# Patient Record
Sex: FEMALE | Race: WHITE | Employment: UNEMPLOYED | URBAN - METROPOLITAN AREA
[De-identification: names, ages, dates, MRNs, and addresses within clinical notes are randomized per-mention and may not be internally consistent; named-entity substitution may affect disease eponyms.]

---

## 2019-05-05 ENCOUNTER — APPOINTMENT (OUTPATIENT)
Dept: GENERAL RADIOLOGY | Age: 79
DRG: 175 | End: 2019-05-05
Payer: MEDICARE

## 2019-05-05 ENCOUNTER — HOSPITAL ENCOUNTER (INPATIENT)
Age: 79
LOS: 1 days | Discharge: HOSPICE | DRG: 175 | End: 2019-05-07
Attending: EMERGENCY MEDICINE | Admitting: HOSPITALIST
Payer: MEDICARE

## 2019-05-05 DIAGNOSIS — I50.9 ACUTE ON CHRONIC CONGESTIVE HEART FAILURE, UNSPECIFIED HEART FAILURE TYPE (HCC): Primary | ICD-10-CM

## 2019-05-05 DIAGNOSIS — R09.02 HYPOXIA: ICD-10-CM

## 2019-05-05 LAB
ALBUMIN SERPL-MCNC: 3.5 GM/DL (ref 3.4–5)
ALP BLD-CCNC: 105 IU/L (ref 40–128)
ALT SERPL-CCNC: 24 U/L (ref 10–40)
ANION GAP SERPL CALCULATED.3IONS-SCNC: 8 MMOL/L (ref 4–16)
AST SERPL-CCNC: 13 IU/L (ref 15–37)
BASE EXCESS MIXED: ABNORMAL (ref 0–2.3)
BASOPHILS ABSOLUTE: 0.1 K/CU MM
BASOPHILS RELATIVE PERCENT: 0.6 % (ref 0–1)
BILIRUB SERPL-MCNC: 0.7 MG/DL (ref 0–1)
BUN BLDV-MCNC: 22 MG/DL (ref 6–23)
CALCIUM SERPL-MCNC: 9 MG/DL (ref 8.3–10.6)
CHLORIDE BLD-SCNC: 96 MMOL/L (ref 99–110)
CO2: 39 MMOL/L (ref 21–32)
COMMENT: ABNORMAL
CREAT SERPL-MCNC: 1.2 MG/DL (ref 0.6–1.1)
DIFFERENTIAL TYPE: ABNORMAL
EOSINOPHILS ABSOLUTE: 0.5 K/CU MM
EOSINOPHILS RELATIVE PERCENT: 6.3 % (ref 0–3)
GFR AFRICAN AMERICAN: 52 ML/MIN/1.73M2
GFR NON-AFRICAN AMERICAN: 43 ML/MIN/1.73M2
GLUCOSE BLD-MCNC: 123 MG/DL (ref 70–99)
HCO3 VENOUS: 45.6 MMOL/L (ref 19–25)
HCT VFR BLD CALC: 41.1 % (ref 37–47)
HEMOGLOBIN: 12.2 GM/DL (ref 12.5–16)
IMMATURE NEUTROPHIL %: 0.5 % (ref 0–0.43)
LYMPHOCYTES ABSOLUTE: 1.6 K/CU MM
LYMPHOCYTES RELATIVE PERCENT: 19.8 % (ref 24–44)
MCH RBC QN AUTO: 27.9 PG (ref 27–31)
MCHC RBC AUTO-ENTMCNC: 29.7 % (ref 32–36)
MCV RBC AUTO: 94.1 FL (ref 78–100)
MONOCYTES ABSOLUTE: 0.5 K/CU MM
MONOCYTES RELATIVE PERCENT: 6.6 % (ref 0–4)
NUCLEATED RBC %: 0 %
O2 SAT, VEN: 77.4 % (ref 50–70)
PCO2, VEN: 77 MMHG (ref 38–52)
PDW BLD-RTO: 16.4 % (ref 11.7–14.9)
PH VENOUS: 7.38 (ref 7.32–7.42)
PLATELET # BLD: 182 K/CU MM (ref 140–440)
PMV BLD AUTO: 10.9 FL (ref 7.5–11.1)
PO2, VEN: 45 MMHG (ref 28–48)
POTASSIUM SERPL-SCNC: 4 MMOL/L (ref 3.5–5.1)
PRO-BNP: 4415 PG/ML
RBC # BLD: 4.37 M/CU MM (ref 4.2–5.4)
SEGMENTED NEUTROPHILS ABSOLUTE COUNT: 5.3 K/CU MM
SEGMENTED NEUTROPHILS RELATIVE PERCENT: 66.2 % (ref 36–66)
SODIUM BLD-SCNC: 143 MMOL/L (ref 135–145)
TOTAL IMMATURE NEUTOROPHIL: 0.04 K/CU MM
TOTAL NUCLEATED RBC: 0 K/CU MM
TOTAL PROTEIN: 5.7 GM/DL (ref 6.4–8.2)
TROPONIN T: <0.01 NG/ML
WBC # BLD: 8 K/CU MM (ref 4–10.5)

## 2019-05-05 PROCEDURE — 99285 EMERGENCY DEPT VISIT HI MDM: CPT

## 2019-05-05 PROCEDURE — 93005 ELECTROCARDIOGRAM TRACING: CPT | Performed by: EMERGENCY MEDICINE

## 2019-05-05 PROCEDURE — 85025 COMPLETE CBC W/AUTO DIFF WBC: CPT

## 2019-05-05 PROCEDURE — 80053 COMPREHEN METABOLIC PANEL: CPT

## 2019-05-05 PROCEDURE — 82805 BLOOD GASES W/O2 SATURATION: CPT

## 2019-05-05 PROCEDURE — 71045 X-RAY EXAM CHEST 1 VIEW: CPT

## 2019-05-05 PROCEDURE — 83880 ASSAY OF NATRIURETIC PEPTIDE: CPT

## 2019-05-05 PROCEDURE — 84484 ASSAY OF TROPONIN QUANT: CPT

## 2019-05-05 RX ORDER — NYSTATIN 100000 [USP'U]/G
POWDER TOPICAL 2 TIMES DAILY
COMMUNITY

## 2019-05-05 RX ORDER — ACETAMINOPHEN 500 MG
1000 TABLET ORAL EVERY 4 HOURS PRN
COMMUNITY

## 2019-05-05 RX ORDER — LIDOCAINE 4 G/G
1 PATCH TOPICAL DAILY
COMMUNITY

## 2019-05-05 RX ORDER — ATENOLOL 50 MG/1
50 TABLET ORAL NIGHTLY
COMMUNITY

## 2019-05-05 RX ORDER — ATORVASTATIN CALCIUM 10 MG/1
10 TABLET, FILM COATED ORAL DAILY
Status: ON HOLD | COMMUNITY
End: 2019-05-07 | Stop reason: HOSPADM

## 2019-05-05 RX ORDER — IPRATROPIUM BROMIDE AND ALBUTEROL SULFATE 2.5; .5 MG/3ML; MG/3ML
1 SOLUTION RESPIRATORY (INHALATION) EVERY 6 HOURS
COMMUNITY

## 2019-05-05 RX ORDER — FUROSEMIDE 40 MG/1
40 TABLET ORAL DAILY
COMMUNITY

## 2019-05-05 RX ORDER — FLUTICASONE FUROATE AND VILANTEROL 100; 25 UG/1; UG/1
1 POWDER RESPIRATORY (INHALATION) DAILY
COMMUNITY

## 2019-05-05 RX ORDER — GABAPENTIN 100 MG/1
200 CAPSULE ORAL 3 TIMES DAILY
COMMUNITY

## 2019-05-05 RX ORDER — CETIRIZINE HYDROCHLORIDE 10 MG/1
10 TABLET ORAL NIGHTLY
Status: ON HOLD | COMMUNITY
End: 2019-05-07 | Stop reason: HOSPADM

## 2019-05-05 SDOH — HEALTH STABILITY: MENTAL HEALTH: HOW OFTEN DO YOU HAVE A DRINK CONTAINING ALCOHOL?: NEVER

## 2019-05-05 ASSESSMENT — ENCOUNTER SYMPTOMS
ABDOMINAL PAIN: 0
SHORTNESS OF BREATH: 1
BACK PAIN: 0
WHEEZING: 0
VOMITING: 0
SORE THROAT: 0
RHINORRHEA: 0
NAUSEA: 0
EYE REDNESS: 0
COUGH: 0

## 2019-05-06 ENCOUNTER — APPOINTMENT (OUTPATIENT)
Dept: ULTRASOUND IMAGING | Age: 79
DRG: 175 | End: 2019-05-06
Payer: MEDICARE

## 2019-05-06 ENCOUNTER — APPOINTMENT (OUTPATIENT)
Dept: CT IMAGING | Age: 79
DRG: 175 | End: 2019-05-06
Payer: MEDICARE

## 2019-05-06 PROBLEM — I50.43 CHF (CONGESTIVE HEART FAILURE), NYHA CLASS I, ACUTE ON CHRONIC, COMBINED (HCC): Status: ACTIVE | Noted: 2019-05-06

## 2019-05-06 LAB
ANION GAP SERPL CALCULATED.3IONS-SCNC: 8 MMOL/L (ref 4–16)
BUN BLDV-MCNC: 21 MG/DL (ref 6–23)
CALCIUM SERPL-MCNC: 8.8 MG/DL (ref 8.3–10.6)
CHLORIDE BLD-SCNC: 95 MMOL/L (ref 99–110)
CO2: 38 MMOL/L (ref 21–32)
CREAT SERPL-MCNC: 1.1 MG/DL (ref 0.6–1.1)
GFR AFRICAN AMERICAN: 58 ML/MIN/1.73M2
GFR NON-AFRICAN AMERICAN: 48 ML/MIN/1.73M2
GLUCOSE BLD-MCNC: 135 MG/DL (ref 70–99)
LV EF: 50 %
LVEF MODALITY: NORMAL
MAGNESIUM: 2.4 MG/DL (ref 1.8–2.4)
POTASSIUM SERPL-SCNC: 3.7 MMOL/L (ref 3.5–5.1)
SODIUM BLD-SCNC: 141 MMOL/L (ref 135–145)
TROPONIN T: <0.01 NG/ML
TROPONIN T: <0.01 NG/ML

## 2019-05-06 PROCEDURE — 84484 ASSAY OF TROPONIN QUANT: CPT

## 2019-05-06 PROCEDURE — 2700000000 HC OXYGEN THERAPY PER DAY

## 2019-05-06 PROCEDURE — 6360000004 HC RX CONTRAST MEDICATION: Performed by: HOSPITALIST

## 2019-05-06 PROCEDURE — 83735 ASSAY OF MAGNESIUM: CPT

## 2019-05-06 PROCEDURE — 2140000000 HC CCU INTERMEDIATE R&B

## 2019-05-06 PROCEDURE — 2580000003 HC RX 258: Performed by: HOSPITALIST

## 2019-05-06 PROCEDURE — 80048 BASIC METABOLIC PNL TOTAL CA: CPT

## 2019-05-06 PROCEDURE — 71275 CT ANGIOGRAPHY CHEST: CPT

## 2019-05-06 PROCEDURE — 93306 TTE W/DOPPLER COMPLETE: CPT

## 2019-05-06 PROCEDURE — 6370000000 HC RX 637 (ALT 250 FOR IP): Performed by: HOSPITALIST

## 2019-05-06 PROCEDURE — 99221 1ST HOSP IP/OBS SF/LOW 40: CPT | Performed by: INTERNAL MEDICINE

## 2019-05-06 PROCEDURE — 6360000002 HC RX W HCPCS: Performed by: HOSPITALIST

## 2019-05-06 PROCEDURE — 6360000002 HC RX W HCPCS: Performed by: INTERNAL MEDICINE

## 2019-05-06 PROCEDURE — 6370000000 HC RX 637 (ALT 250 FOR IP): Performed by: NURSE PRACTITIONER

## 2019-05-06 PROCEDURE — 93970 EXTREMITY STUDY: CPT

## 2019-05-06 PROCEDURE — 94640 AIRWAY INHALATION TREATMENT: CPT

## 2019-05-06 PROCEDURE — 36415 COLL VENOUS BLD VENIPUNCTURE: CPT

## 2019-05-06 RX ORDER — FUROSEMIDE 10 MG/ML
20 INJECTION INTRAMUSCULAR; INTRAVENOUS 2 TIMES DAILY
Status: DISCONTINUED | OUTPATIENT
Start: 2019-05-06 | End: 2019-05-07 | Stop reason: HOSPADM

## 2019-05-06 RX ORDER — SODIUM CHLORIDE 0.9 % (FLUSH) 0.9 %
10 SYRINGE (ML) INJECTION PRN
Status: DISCONTINUED | OUTPATIENT
Start: 2019-05-06 | End: 2019-05-07 | Stop reason: HOSPADM

## 2019-05-06 RX ORDER — ATORVASTATIN CALCIUM 10 MG/1
10 TABLET, FILM COATED ORAL DAILY
Status: DISCONTINUED | OUTPATIENT
Start: 2019-05-06 | End: 2019-05-07 | Stop reason: HOSPADM

## 2019-05-06 RX ORDER — ATENOLOL 25 MG/1
12.5 TABLET ORAL NIGHTLY
Status: DISCONTINUED | OUTPATIENT
Start: 2019-05-07 | End: 2019-05-07 | Stop reason: HOSPADM

## 2019-05-06 RX ORDER — SODIUM CHLORIDE 0.9 % (FLUSH) 0.9 %
10 SYRINGE (ML) INJECTION EVERY 12 HOURS SCHEDULED
Status: DISCONTINUED | OUTPATIENT
Start: 2019-05-06 | End: 2019-05-07 | Stop reason: HOSPADM

## 2019-05-06 RX ORDER — GABAPENTIN 100 MG/1
200 CAPSULE ORAL 3 TIMES DAILY
Status: DISCONTINUED | OUTPATIENT
Start: 2019-05-06 | End: 2019-05-07 | Stop reason: HOSPADM

## 2019-05-06 RX ORDER — ASPIRIN 81 MG/1
81 TABLET, CHEWABLE ORAL DAILY
Status: DISCONTINUED | OUTPATIENT
Start: 2019-05-06 | End: 2019-05-07 | Stop reason: HOSPADM

## 2019-05-06 RX ORDER — MULTIVITAMIN WITH FOLIC ACID 400 MCG
1 TABLET ORAL DAILY
Status: DISCONTINUED | OUTPATIENT
Start: 2019-05-06 | End: 2019-05-07 | Stop reason: HOSPADM

## 2019-05-06 RX ORDER — IPRATROPIUM BROMIDE AND ALBUTEROL SULFATE 2.5; .5 MG/3ML; MG/3ML
1 SOLUTION RESPIRATORY (INHALATION)
Status: DISCONTINUED | OUTPATIENT
Start: 2019-05-06 | End: 2019-05-07 | Stop reason: HOSPADM

## 2019-05-06 RX ORDER — ACETAMINOPHEN 325 MG/1
650 TABLET ORAL EVERY 4 HOURS PRN
Status: DISCONTINUED | OUTPATIENT
Start: 2019-05-06 | End: 2019-05-07 | Stop reason: HOSPADM

## 2019-05-06 RX ORDER — ONDANSETRON 2 MG/ML
4 INJECTION INTRAMUSCULAR; INTRAVENOUS EVERY 6 HOURS PRN
Status: DISCONTINUED | OUTPATIENT
Start: 2019-05-06 | End: 2019-05-07 | Stop reason: HOSPADM

## 2019-05-06 RX ADMIN — ACETAMINOPHEN 650 MG: 325 TABLET ORAL at 20:23

## 2019-05-06 RX ADMIN — IPRATROPIUM BROMIDE AND ALBUTEROL SULFATE 1 AMPULE: .5; 3 SOLUTION RESPIRATORY (INHALATION) at 19:09

## 2019-05-06 RX ADMIN — IPRATROPIUM BROMIDE AND ALBUTEROL SULFATE 1 AMPULE: .5; 3 SOLUTION RESPIRATORY (INHALATION) at 12:24

## 2019-05-06 RX ADMIN — CEFTRIAXONE 1 G: 1 INJECTION, POWDER, FOR SOLUTION INTRAMUSCULAR; INTRAVENOUS at 07:03

## 2019-05-06 RX ADMIN — IPRATROPIUM BROMIDE AND ALBUTEROL SULFATE 1 AMPULE: .5; 3 SOLUTION RESPIRATORY (INHALATION) at 08:33

## 2019-05-06 RX ADMIN — FUROSEMIDE 20 MG: 10 INJECTION, SOLUTION INTRAVENOUS at 09:03

## 2019-05-06 RX ADMIN — SODIUM CHLORIDE, PRESERVATIVE FREE 10 ML: 5 INJECTION INTRAVENOUS at 20:27

## 2019-05-06 RX ADMIN — ASPIRIN 81 MG 81 MG: 81 TABLET ORAL at 09:03

## 2019-05-06 RX ADMIN — GABAPENTIN 200 MG: 100 CAPSULE ORAL at 09:03

## 2019-05-06 RX ADMIN — ATORVASTATIN CALCIUM 10 MG: 10 TABLET, FILM COATED ORAL at 09:03

## 2019-05-06 RX ADMIN — FUROSEMIDE 20 MG: 10 INJECTION, SOLUTION INTRAVENOUS at 02:06

## 2019-05-06 RX ADMIN — IPRATROPIUM BROMIDE AND ALBUTEROL SULFATE 1 AMPULE: .5; 3 SOLUTION RESPIRATORY (INHALATION) at 15:19

## 2019-05-06 RX ADMIN — THERA TABS 1 TABLET: TAB at 09:03

## 2019-05-06 RX ADMIN — SODIUM CHLORIDE, PRESERVATIVE FREE 10 ML: 5 INJECTION INTRAVENOUS at 09:03

## 2019-05-06 RX ADMIN — GABAPENTIN 200 MG: 100 CAPSULE ORAL at 20:23

## 2019-05-06 RX ADMIN — GABAPENTIN 200 MG: 100 CAPSULE ORAL at 14:46

## 2019-05-06 RX ADMIN — IOPAMIDOL 80 ML: 755 INJECTION, SOLUTION INTRAVENOUS at 03:01

## 2019-05-06 RX ADMIN — FUROSEMIDE 20 MG: 10 INJECTION, SOLUTION INTRAVENOUS at 20:24

## 2019-05-06 RX ADMIN — ENOXAPARIN SODIUM 135 MG: 150 INJECTION SUBCUTANEOUS at 04:01

## 2019-05-06 RX ADMIN — AZITHROMYCIN MONOHYDRATE 500 MG: 500 INJECTION, POWDER, LYOPHILIZED, FOR SOLUTION INTRAVENOUS at 07:33

## 2019-05-06 ASSESSMENT — PAIN SCALES - GENERAL
PAINLEVEL_OUTOF10: 2
PAINLEVEL_OUTOF10: 2
PAINLEVEL_OUTOF10: 0
PAINLEVEL_OUTOF10: 0

## 2019-05-06 ASSESSMENT — PAIN DESCRIPTION - LOCATION: LOCATION: GENERALIZED

## 2019-05-06 ASSESSMENT — PAIN DESCRIPTION - PAIN TYPE: TYPE: ACUTE PAIN

## 2019-05-06 ASSESSMENT — PAIN DESCRIPTION - ONSET: ONSET: GRADUAL

## 2019-05-06 ASSESSMENT — PAIN DESCRIPTION - FREQUENCY: FREQUENCY: INTERMITTENT

## 2019-05-06 ASSESSMENT — PAIN DESCRIPTION - DESCRIPTORS: DESCRIPTORS: ACHING;DISCOMFORT

## 2019-05-06 NOTE — CONSULTS
Nutrition Education    Type and Reason for Visit: Consult, Patient Education    · Verbally reviewed following information with Patient and Family: Heart Healthy Eating Nutrition Therapy (Nutrition Care Manual). · Written educational materials provided. · Contact name and number provided. · Refer to Patient Education activity for more details.     Electronically signed by Sharon Kim RD, LD on 5/6/19 at 11:51 AM    Contact Number: [phone]

## 2019-05-06 NOTE — PROGRESS NOTES
Skin assessment complete with second RN Nelly Males. Redness on abd folds, groin area, under both breasts, and buttocks. BL legs have redness and scabs are swollen and wrapped in ace bandages.

## 2019-05-06 NOTE — PROGRESS NOTES
Spoke to nurse. Patient on hospice. Switched to hi mickey nasal cannula of 8. Patient comfortable with mickey.

## 2019-05-06 NOTE — PROGRESS NOTES
Called house supervisor to advised Doctor and put in order for transfer to ICU due to PE and heart strain.

## 2019-05-06 NOTE — PROGRESS NOTES
Hospitalist Progress Note      Name:  Carolyn Johnson /Age/Sex: 1940  (78 y.o. female)   MRN & CSN:  [id number] & [id number] Admission Date/Time: 2019 10:12 PM   Location:  [facility] PCP: No primary care provider on file. Carolyn Johnson is a 78 y.o.  female  who presents with Shortness of Breath      Assessment and Plan:   B/L PE, cor pulmonale  - full dose lovenox  - d/w cardio no ekos and treat medically  - echo pending  - LE duplex: neg DVT  - cardio following    Acute Resp Failure  - likely 2.2 to above  - on vapotherm  - consult pulmonology    Acute on Chronic CHF  - echo pending  - IV lasix  - strict I and O  - low sodium diet  - cardio following    Possible CAP  - IV Rocephin + Zithromax    Hx of prior PE last year that resolved per family  Hx of back surgery  COPD                            Diet DIET CARDIAC;   Code Status DNR-CCA     Medications:   Medications:    aspirin  81 mg Oral Daily    [START ON 2019] atenolol  12.5 mg Oral Nightly    atorvastatin  10 mg Oral Daily    ipratropium-albuterol  1 ampule Inhalation Q4H WA    gabapentin  200 mg Oral TID    multivitamin  1 tablet Oral Daily    sodium chloride flush  10 mL Intravenous 2 times per day    furosemide  20 mg Intravenous BID    enoxaparin  1 mg/kg Subcutaneous BID    cefTRIAXone (ROCEPHIN) IV  1 g Intravenous Q24H    azithromycin  500 mg Intravenous Q24H      Infusions:   PRN Meds:   sodium chloride flush 10 mL PRN   magnesium hydroxide 30 mL Daily PRN   ondansetron 4 mg Q6H PRN     Subjective:     Sleeping   Objective:        Intake/Output Summary (Last 24 hours) at 2019 1054  Last data filed at 2019 0854  Gross per 24 hour   Intake 480 ml   Output --   Net 480 ml      Vitals:   Vitals:    19 0949   BP:    Pulse:    Resp:    Temp:    SpO2: 93%     Physical Exam:   Gen: sleeping  Head/Eyes:  Normocephalic atraumatic, EOMI   NECK:   symmetrical, trachea midline  LUNGS: Normal Effort on vapotherm  CARDIOVASCULAR:  Normal rate, 1+ pedal edema  ABDOMEN: Non tender, non distended, no HSM noted.   MUSCULOSKELETAL:  ROM limited  NEUROLOGIC: sleeping   SKIN:  no bruising or bleeding, normal skin color,  no redness      Data:       CBC   Recent Labs     05/05/19 2237   WBC 8.0   HGB 12.2*   HCT 41.1         BMP Recent Labs     05/05/19 2237 05/06/19  0849    141   K 4.0 3.7   CL 96* 95*   CO2 39* 38*   BUN 22 21   CREATININE 1.2* 1.1         Electronically signed by Stan Clifford MD on 5/6/2019 at 10:54 AM

## 2019-05-06 NOTE — PROGRESS NOTES
Heated high flow nasal cannula set up with a flow of 20 lpm and FIo2 of 40%. Patient's O2 sat ranged 96-97% on these settings.

## 2019-05-06 NOTE — ED PROVIDER NOTES
 magnesium hydroxide (MILK OF MAGNESIA) 400 MG/5ML suspension 30 mL  30 mL Oral Daily PRN Shobha Carpenter MD        ondansetron (ZOFRAN) injection 4 mg  4 mg Intravenous Q6H PRN Shobha Carpenter MD        enoxaparin (LOVENOX) injection 40 mg  40 mg Subcutaneous Daily Shobha Carpenter MD        furosemide (LASIX) injection 20 mg  20 mg Intravenous BID Shobha Carpenter MD   20 mg at 05/06/19 0206     Allergies   Allergen Reactions    Sulfa Antibiotics     Tetanus Toxoids        Nursing Notes Reviewed     Physical Exam:   ED Triage Vitals [05/05/19 2218]   Enc Vitals Group      /61      Pulse 67      Resp 22      Temp 97.7 °F (36.5 °C)      Temp Source Oral      SpO2 (!) 82 %      Weight       Height       Head Circumference       Peak Flow       Pain Score       Pain Loc       Pain Edu? Excl. in [address]? /79   Pulse 66   Temp 97.5 °F (36.4 °C) (Oral)   Resp 18   Ht 5' 9\" (1.753 m)   Wt (!) 307 lb 4 oz (139.4 kg)   SpO2 92%   BMI 45.37 kg/m²   My pulse ox interpretation is - abnormal  Physical Exam   Constitutional: She appears well-developed and well-nourished. Appears to feel unwell     HENT:   Head: Normocephalic and atraumatic. Eyes: Conjunctivae are normal. Right eye exhibits no discharge. Left eye exhibits no discharge. Cardiovascular: Normal rate, regular rhythm and intact distal pulses. Pulmonary/Chest: Effort normal and breath sounds normal. No respiratory distress. She has no wheezes. Abdominal: Soft. Bowel sounds are normal. She exhibits no distension. There is no tenderness. There is no rebound and no guarding. Musculoskeletal: Normal range of motion. She exhibits edema (3+ edema in bilateral lower extremites). She exhibits no deformity. Neurological: She is alert. No cranial nerve deficit. Skin: Skin is warm and dry. She is not diaphoretic. Psychiatric: She has a normal mood and affect.  Her behavior is normal.       I have reviewed and interpreted all of the currently available lab results from this visit (if applicable):  Results for orders placed or performed during the hospital encounter of 05/05/19   CBC Auto Differential   Result Value Ref Range    WBC 8.0 4.0 - 10.5 K/CU MM    RBC 4.37 4.2 - 5.4 M/CU MM    Hemoglobin 12.2 (L) 12.5 - 16.0 GM/DL    Hematocrit 41.1 37 - 47 %    MCV 94.1 78 - 100 FL    MCH 27.9 27 - 31 PG    MCHC 29.7 (L) 32.0 - 36.0 %    RDW 16.4 (H) 11.7 - 14.9 %    Platelets 169 237 - 286 K/CU MM    MPV 10.9 7.5 - 11.1 FL    Differential Type AUTOMATED DIFFERENTIAL     Segs Relative 66.2 (H) 36 - 66 %    Lymphocytes % 19.8 (L) 24 - 44 %    Monocytes % 6.6 (H) 0 - 4 %    Eosinophils % 6.3 (H) 0 - 3 %    Basophils % 0.6 0 - 1 %    Segs Absolute 5.3 K/CU MM    Lymphocytes # 1.6 K/CU MM    Monocytes # 0.5 K/CU MM    Eosinophils # 0.5 K/CU MM    Basophils # 0.1 K/CU MM    Nucleated RBC % 0.0 %    Total Nucleated RBC 0.0 K/CU MM    Total Immature Neutrophil 0.04 K/CU MM    Immature Neutrophil % 0.5 (H) 0 - 0.43 %   Comprehensive Metabolic Panel w/ Reflex to MG   Result Value Ref Range    Sodium 143 135 - 145 MMOL/L    Potassium 4.0 3.5 - 5.1 MMOL/L    Chloride 96 (L) 99 - 110 mMol/L    CO2 39 (H) 21 - 32 MMOL/L    BUN 22 6 - 23 MG/DL    CREATININE 1.2 (H) 0.6 - 1.1 MG/DL    Glucose 123 (H) 70 - 99 MG/DL    Calcium 9.0 8.3 - 10.6 MG/DL    Alb 3.5 3.4 - 5.0 GM/DL    Total Protein 5.7 (L) 6.4 - 8.2 GM/DL    Total Bilirubin 0.7 0.0 - 1.0 MG/DL    ALT 24 10 - 40 U/L    AST 13 (L) 15 - 37 IU/L    Alkaline Phosphatase 105 40 - 128 IU/L    GFR Non- 43 (L) >60 mL/min/1.73m2    GFR  52 (L) >60 mL/min/1.73m2    Anion Gap 8 4 - 16   Brain Natriuretic Peptide   Result Value Ref Range    Pro-BNP 4,415 (H) <300 PG/ML   Troponin   Result Value Ref Range    Troponin T <0.010 <0.01 NG/ML   Blood Gas, Venous   Result Value Ref Range    pH, Solomon 7.38 7.32 - 7.42    pCO2, Solomon 77 (H) 38 - 52 mmHG    pO2, Solomon 45 28 - 48 mmHG    Base Exc, Mixed 16.4  PLUS (H) 0 - 2.3    HCO3, Venous 45.6 (H) 19 - 25 MMOL/L    O2 Sat, Solomon 77.4 (H) 50 - 70 %    Comment VBG       Radiographs (if obtained):  [] The following radiograph was interpreted by myself in the absence of a radiologist:  [x]Radiologist's Report Reviewed:  XR CHEST PORTABLE   Final Result   Elevated left hemidiaphragm. Possible mild pulmonary vascular congestion. Prominent appearing aortic knob shadow. Consider CTA for further evaluation   if clinically warranted. VL DUP LOWER EXTREMITY VENOUS BILATERAL    (Results Pending)   CTA PULMONARY W CONTRAST    (Results Pending)         EKG (if obtained): (All EKG's are interpreted by myself in the absence of a cardiologist)  Normal sinus rhythm with a rate of 64. MD interval 192, , . No ST elevations or depressions. Normal T waves. Left bundle-branch block. Impression: Abnormal EKG. No previous EKGs for comparison. MDM:  Differential diagnoses considered include CHF exacerbation, COPD exacerbation, pneumonia, pneumothorax, deconditioning. Patient is requiring a nonrebreather to maintain oxygen saturations in the 90s. She does not appear short of breath. Basic labs are obtained and are unremarkable. Her BNP is significantly elevated. Troponin is normal.  Chest x-ray shows possible vascular congestion. There is also prominence of the aortic knob shadow. She has not had any chest pain. I do not suspect an aortic dissection or an aortic aneurysm causing her symptoms. I suspect likely a CHF exacerbation given the elevated BNP, vascular congestion on x-ray and peripheral swelling. Patient's blood pressure is on the low side. Given this I held Lasix and deferred this trace to the hospitalist.  [facility] admit her to the hospital for further evaluation and treatment of her hypoxia and shortness of breath. I spoke with Dr. Mary Kong, who graciously agreed to admit the patient. Plan of care explained to patient.  All questions and concerns were addressed to the patient's satisfaction. Patient understood and agreed with plan. Clinical Impression:  1. Acute on chronic congestive heart failure, unspecified heart failure type ([address]    2. Hypoxia          Cipriano Palomares MD       Please note that portions of this note may have been complete with a voice recognition program.  Effortswere made to edit the dictations, but occasional words are mis-transcribed.           Cipriano Palomares MD  05/06/19 3758

## 2019-05-06 NOTE — PROGRESS NOTES
Called to patient's room due to patient's O2 sat decreasing while Heated high flow nasal cannula at 20 lpm with Fio2 of 40%. Increased flow to 30 lpm and increased Fio2 to 60%. O2 sat was 94% on these settings when leaving patient's room.

## 2019-05-06 NOTE — CARE COORDINATION
NORMAW reviewed chart. Pt drgt Fernanda Runner) at [facility]. They were in the process of contacting Hospice for the Pt. Dgt would like that process to continue while Pt is in the hospital.     LSW was walking in the hallway and Pt dgt stopped LSW. Pt opened her eyes and they discussed hospice and Pt would like hospice. LSW in the room to speak with Pt dgt and spouse Sebastian Islas). [facility]. They do not want, at this time, to have Pt go back to [facility]. They are hoping that the Pt can go to hospice in [place]. LSW called referral in to [facility].      Electronically signed by MABEL Oden on 5/6/2019 at 3:33 PM

## 2019-05-06 NOTE — ED TRIAGE NOTES
To ED from [facility] for shortness of breath,02 sat 82% on 5 l per NC,Dr Villarreal notified,02 applied per NRB at 100%

## 2019-05-06 NOTE — ED NOTES
[id number] assigned/clean @ [facility] [address] notified of room status.      Radha Garcia  05/06/19 0021

## 2019-05-06 NOTE — CONSULTS
furosemide (LASIX) injection 20 mg BID   enoxaparin (LOVENOX) injection 135 mg BID     Current Facility-Administered Medications   Medication Dose Route Frequency Provider Last Rate Last Dose    aspirin chewable tablet 81 mg  81 mg Oral Daily MD Edinson Elder [START ON 5/7/2019] atenolol (TENORMIN) tablet 12.5 mg  12.5 mg Oral Nightly Mikala Walden MD        atorvastatin (LIPITOR) tablet 10 mg  10 mg Oral Daily Mikala Walden MD        ipratropium-albuterol (DUONEB) nebulizer solution 1 ampule  1 ampule Inhalation Q4H WA Mikala Walden MD        gabapentin (NEURONTIN) capsule 200 mg  200 mg Oral TID Mikala Walden MD        multivitamin 1 tablet  1 tablet Oral Daily Mikala Walden MD        sodium chloride flush 0.9 % injection 10 mL  10 mL Intravenous 2 times per day Mikala Walden MD        sodium chloride flush 0.9 % injection 10 mL  10 mL Intravenous PRN Mikala Walden MD        magnesium hydroxide (MILK OF MAGNESIA) 400 MG/5ML suspension 30 mL  30 mL Oral Daily PRN Mikala Walden MD        ondansetron (ZOFRAN) injection 4 mg  4 mg Intravenous Q6H PRN Mikala Walden MD        furosemide (LASIX) injection 20 mg  20 mg Intravenous BID Mikala Walden MD   20 mg at 05/06/19 0206    enoxaparin (LOVENOX) injection 135 mg  1 mg/kg Subcutaneous BID Mikala Walden MD         Review of Systems:   · Constitutional: No Fever or Weight Loss   · Eyes: No Decreased Vision  · ENT: No Headaches, Hearing Loss or Vertigo  · Cardiovascular: No chest pain,+ dyspnea on exertion, palpitations or loss of consciousness  · Respiratory: No cough or wheezing    · Gastrointestinal: No abdominal pain, appetite loss, blood in stools, constipation, diarrhea or heartburn  · Genitourinary: No dysuria, trouble voiding, or hematuria  · Musculoskeletal:  No gait disturbance, weakness or joint complaints  · Integumentary: No rash or pruritis  · Neurological: No TIA or stroke symptoms  · Psychiatric: No anxiety or depression  · Endocrine: No malaise, fatigue or temperature intolerance  · Hematologic/Lymphatic: No bleeding problems, blood clots or swollen lymph nodes  · Allergic/Immunologic: No nasal congestion or hives  All systems negative except as marked. ·   ·      Physical Examination:    Vitals:    05/06/19 0515   BP: 115/69   Pulse: 66   Resp: 17   Temp: 98.3 °F (36.8 °C)   SpO2: (!) 86%      Wt Readings from Last 3 Encounters:   05/06/19 (!) 307 lb 4 oz (139.4 kg)     Body mass index is 45.37 kg/m². General Appearance:  No distress, conversant    Constitutional:  Well developed, Well nourished, No acute distress, Non-toxic appearance. HENT:  Normocephalic, Atraumatic, Bilateral external ears normal, Oropharynx moist, No oral exudates, Nose normal. Neck- Normal range of motion, No tenderness, Supple, No stridor,no apical-carotid delay, no carotid bruit  Eyes:  PERRL, EOMI, Conjunctiva normal, No discharge. Respiratory:  Normal breath sounds, No respiratory distress, No wheezing, No chest tenderness. ,no use of accessory muscles, diaphragm movement is normal  Cardiovascular: (PMI) apex non displaced,no lifts no thrills, no s3,no s4, Normal heart rate, Normal rhythm, No murmurs, No rubs, No gallops. Carotid arteries pulse and amplitude are normal no bruit, no abdominal bruit noted ( normal abdominal aorta ausculation), femoral arteries pulse and amplitude are normal no bruit, pedal pulses are normal  GI:  Bowel sounds normal, Soft, No tenderness, No masses, No pulsatile masses, no hepatosplenomegally, no bruits  : External genitalia appear normal, No masses or lesions. No discharge. No CVA tenderness. Musculoskeletal:  Statis dermatitis, and  edema, No tenderness, No cyanosis, No clubbing. Good range of motion in all major joints. No tenderness to palpation or major deformities noted. Back- No tenderness. Integument:  Warm, Dry, No erythema, No rash. Skin: no rash, no ulcers  Lymphatic:  No lymphadenopathy noted.    Neurologic:  Alert &

## 2019-05-06 NOTE — ED NOTES
Pt remains on 15 L o2 via non re-breather mask. spo2 92%.      Pt alert and oriented x 4.      Elisha Marvin RN  05/05/19 4171

## 2019-05-06 NOTE — CONSULTS
status:      Spouse name: None    Number of children: None    Years of education: None    Highest education level: None   Occupational History    None   Social Needs    Financial resource strain: None    Food insecurity:     Worry: None     Inability: None    Transportation needs:     Medical: None     Non-medical: None   Tobacco Use    Smoking status: Former Smoker     Types: Cigarettes    Smokeless tobacco: Never Used   Substance and Sexual Activity    Alcohol use: Never     Frequency: Never    Drug use: Never    Sexual activity: None   Lifestyle    Physical activity:     Days per week: None     Minutes per session: None    Stress: None   Relationships    Social connections:     Talks on phone: None     Gets together: None     Attends Rastafari service: None     Active member of club or organization: None     Attends meetings of clubs or organizations: None     Relationship status: None    Intimate partner violence:     Fear of current or ex partner: None     Emotionally abused: None     Physically abused: None     Forced sexual activity: None   Other Topics Concern    None   Social History Narrative    None       Family History:   History reviewed. No pertinent family history. Immunization:    There is no immunization history on file for this patient. REVIEW OF SYSTEMS:    CONSTITUTIONAL:  negative for fevers, chills, diaphoresis, activity change, appetite change, fatigue, night sweats and unexpected weight change.    EYES:  negative for blurred vision, eye discharge, visual disturbance and icterus  HEENT:  negative for hearing loss, tinnitus, ear drainage, sinus pressure, nasal congestion, epistaxis and snoring  RESPIRATORY:  See HPI  CARDIOVASCULAR:  negative for chest pain, palpitations, exertional chest pressure/discomfort, edema, syncope  GASTROINTESTINAL:  negative for nausea, vomiting, diarrhea, constipation, blood in stool and abdominal pain  GENITOURINARY:  negative for frequency, dysuria and hematuria  HEMATOLOGIC/LYMPHATIC:  negative for easy bruising, bleeding and lymphadenopathy  ALLERGIC/IMMUNOLOGIC:  negative for recurrent infections, angioedema, anaphylaxis and drug reactions  ENDOCRINE:  negative for weight changes and diabetic symptoms including polyuria, polydipsia and polyphagia    MUSCULOSKELETAL:  negative for  pain, joint swelling, decreased range of motion and muscle weakness  NEUROLOGICAL:  negative for headaches, slurred speech, unilateral weakness  PSYCHIATRIC/BEHAVIORAL: negative for hallucinations, behavioral problems, confusion and agitation. Objective:   PHYSICAL EXAM:      VITALS:    Vitals:    05/06/19 0854 05/06/19 0901 05/06/19 0945 05/06/19 0949   BP:  102/66     Pulse: 69 73     Resp: 18 17     Temp:  98.1 °F (36.7 °C)     TempSrc:  Oral     SpO2: 93% 90% (!) 84% 93%   Weight:       Height:             CONSTITUTIONAL:  awake, alert, cooperative, no apparent distress, and appears stated age  NECK:  Supple, symmetrical, trachea midline, no adenopathy, thyroid symmetric, not enlarged and no tenderness  CHEST: Chest expansion equal and symmetrical, no intercostal retraction. LUNGS:  no increased work of breathing, has expiratory wheezes both lungs, no crackles. CARDIOVASCULAR: S1 and S2, no edema and no JVD  ABDOMEN:  normal bowel sounds, non-distended and no masses palpated, and no tenderness to palpation. No hepatospleenomegaly  LYMPHADENOPATHY:  no axillary or supraclavicular adenopathy. No cervical adnenopathy  PSYCHIATRIC: Oriented to person place and time. No obvious depression or anxiety. MUSCULOSKELETAL: No obvious misalignment or effusion of the joints. No clubbing, cyanosis of the digits. RIGHT AND LEFT LOWER EXTREMITIES: No edema, no inflammation, no tenderness. SKIN:  normal skin color, texture, turgor and no redness, warmth, or swelling.  No palpable nodules    DATA:         EXAMINATION:   CTA OF THE CHEST 5/6/2019 2:48 am     TECHNIQUE:   CTA of the chest was performed after the administration of intravenous   contrast.  Multiplanar reformatted images are provided for review.  MIP   images are provided for review. Dose modulation, iterative reconstruction,   and/or weight based adjustment of the mA/kV was utilized to reduce the   radiation dose to as low as reasonably achievable.       COMPARISON:   Chest radiograph dated May 5, 2019       HISTORY:   ORDERING SYSTEM PROVIDED HISTORY: DYSPNEA, ON EXERTION       FINDINGS:   Pulmonary Arteries: Pulmonary arteries are adequately opacified for   evaluation.  Extensive tumor thrombus is identified involving the bilateral   main pulmonary arteries extending to the lobar and segmental branches   bilaterally.  Main pulmonary artery is enlarged, measuring 3.9 cm in diameter.       RV-LV ratio: 1.3       Mediastinum: Heart is normal in size without a pericardial effusion. 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       No pathologically enlarged mediastinal or hilar nodes.       Lungs/pleura: Evaluation of the lung parenchyma is limited secondary to   motion.  Ground-glass opacities are seen in the right upper lung zone and   lingula.  More dense consolidation is seen in the left lower lobe.  Central   airways are patent.  Mild bronchial wall thickening.  No bronchiectasis.       Upper Abdomen: Limited images of the upper abdomen are unremarkable.       Soft Tissues/Bones: No acute bone or soft tissue abnormality.           Impression   1. Extensive pulmonary emboli bilaterally with suggestive of right heart   strain. 2. Nonspecific ground-glass opacities in the mid to upper lung zones as well   as more focal consolidation in the left base.  Differential considerations   include superimposed infection.    Findings were discussed with Dr. Vandana Ramires on 05/06/2019 at 3:05 a.m.           Order History     Open Order Details   PACS Images     [facility] images for CTA PULMONARY W CONTRAST                       Assessment:     1. Acute extensive cara pulmonary emboli  2. Ac resp failure requiring vapotherm  3. Right heqart strain/corpulmonale  4. Poss pneumonia CA          Plan:     1. D/w pt  and daughter. Told about dx and their questions were answered. 2. Pt request no rx. She is well aware of complications including death. She says that she has beautiful 78 years of life and wants to live no more   3. She will be good candidate for TPA/EKOS if she agrees  4. She will need indefinite anticoagulation if she agrees  5. Agree with antibx  6.  Thanks will follow

## 2019-05-06 NOTE — H&P
Department of Internal Medicine  Hospitalist  Attending History and Physical      CHIEF COMPLAINT:  Short of breath    Reason for Admission:  CHF    History Obtained From:  patient    HISTORY OF PRESENT ILLNESS:      The patient is a 78 y.o. female with significant past medical history of COPD, CHF, PE presents as while at [facility] home she has been more short of breath with no cough or fevers. Has noticed increased swelling of legs. She was up at 5L and they could not increase oxygen higher and sent to the ER. She is lethargic at bedside. SBP 90-100s.  states in august last yr she was admitted there for CHF and also had a PE. She is not currently on [facility]. Past Medical History:        Diagnosis Date    [facility] (congestive heart failure) ([facility])     Cognitive communication deficit     COPD (chronic obstructive pulmonary disease) ([facility])     Difficulty in walking, not elsewhere classified     Hypertension     Other intervertebral disc degeneration, lumbar region     Secondary hypertension, unspecified      Past Surgical History:    History reviewed. No pertinent surgical history. Denies any past surgeries  Immunizations:         Medications Prior to Admission:    No current facility-administered medications on file prior to encounter.       Current Outpatient Medications on File Prior to Encounter   Medication Sig Dispense Refill    aspirin 81 MG tablet Take 81 mg by mouth daily      lidocaine (ASPERCREME LIDOCAINE) 4 % external patch Place 1 patch onto the skin daily To L hip      atenolol (TENORMIN) 50 MG tablet Take 50 mg by mouth nightly      atorvastatin (LIPITOR) 10 MG tablet Take 10 mg by mouth daily      fluticasone-vilanterol (BREO ELLIPTA) 100-25 MCG/INH AEPB inhaler Inhale 1 puff into the lungs daily      Multiple Vitamin (MULTIVITAMINS PO) Take 1 tablet by mouth daily      furosemide (LASIX) 40 MG tablet Take 40 mg by mouth daily      acetaminophen (TYLENOL) 500 MG tablet Take 1,000 mg by mouth every 4 hours as needed for Pain      Magnesium Hydroxide (MILK OF MAGNESIA PO) Take by mouth 30 ml by mouth every 24 hours as needed for constipation if no BM x 3 days      Ipratropium-Albuterol (COMBIVENT IN) Inhale 2 puffs into the lungs 4 times daily as needed For SOB      gabapentin (NEURONTIN) 100 MG capsule Take 200 mg by mouth 3 times daily.       ipratropium-albuterol (DUONEB) 0.5-2.5 (3) MG/3ML SOLN nebulizer solution Inhale 1 vial into the lungs every 6 hours      ammonium lactate (LAC-HYDRIN) 5 % LOTN lotion Apply topically as needed To BLE every shift for vascular rowell /scaling      nystatin (MYCOSTATIN) 642835 UNIT/GM powder Apply topically 2 times daily To groin area      cetirizine (ZYRTEC) 10 MG tablet Take 10 mg by mouth nightly           Allergies:  Sulfa antibiotics and Tetanus toxoids    Social History:   Social History     Socioeconomic History    Marital status:      Spouse name: Not on file    Number of children: Not on file    Years of education: Not on file    Highest education level: Not on file   Occupational History    Not on file   Social Needs    Financial resource strain: Not on file    Food insecurity:     Worry: Not on file     Inability: Not on file    Transportation needs:     Medical: Not on file     Non-medical: Not on file   Tobacco Use    Smoking status: Former Smoker     Types: Cigarettes    Smokeless tobacco: Never Used   Substance and Sexual Activity    Alcohol use: Never     Frequency: Never    Drug use: Never    Sexual activity: Not on file   Lifestyle    Physical activity:     Days per week: Not on file     Minutes per session: Not on file    Stress: Not on file   Relationships    Social connections:     Talks on phone: Not on file     Gets together: Not on file     Attends [employer]an service: Not on file     Active member of club or organization: Not on file     Attends meetings of clubs or organizations: Not on file Relationship status: Not on file    Intimate partner violence:     Fear of current or ex partner: Not on file     Emotionally abused: Not on file     Physically abused: Not on file     Forced sexual activity: Not on file   Other Topics Concern    Not on file   Social History Narrative    Not on file         Family History:   History reviewed. No pertinent family history. Mother with CHF  REVIEW OF SYSTEMS:  CONSTITUTIONAL:  negative  EYES:  negative  HEENT:  negative  RESPIRATORY:  positive for  dyspnea  CARDIOVASCULAR:  negative  GASTROINTESTINAL:  negative  ALLERGIC/IMMUNOLOGIC:  negative  MUSCULOSKELETAL:  Positive for swelling of legs  NEUROLOGICAL:  negative  BEHAVIOR/PSYCH:  negative  PHYSICAL EXAM:    Vitals:  BP 91/74   Pulse 64   Temp 97.7 °F (36.5 °C) (Oral)   Resp 22   SpO2 93%     CONSTITUTIONAL:  fatigued  EYES:  Lids and lashes normal, pupils equal, round and reactive to light, extra ocular muscles intact, sclera clear, conjunctiva normal  ENT:  Normocephalic, without obvious abnormality, atraumatic, sinuses nontender on palpation, external ears without lesions, LUNGS:  Decreased and NRB  CARDIOVASCULAR:  Normal apical impulse, regular rate and rhythm, normal S1 and S2, no S3 or S4, and no murmur noted  ABDOMEN:  No scars, normal bowel sounds, soft, non-distended, non-tender, no masses palpated, no hepatosplenomegally  MUSCULOSKELETAL: bilateral LE edema and wrapped  NEUROLOGIC: Fatigued   Cranial nerves II-XII are grossly intact. Decreased movement as lethargic .   SKIN:  no bruising or bleeding and BLE wrapped    DATA:  CXR  Elevated left hemidiaphragm.       Possible mild pulmonary vascular congestion.       Prominent appearing aortic knob shadow.  Consider CTA for further evaluation   if clinically warranted     EKG  ASSESSMENT AND PLAN:    Dyspnea and hypoxic resp failure due to suspected acute on chronic diastolic CHF   -IV lasix, lower BB as SBP 90-100s  -echo and serial CE, consult cardiology  -with hx of VTE check CTA chest and doppler LE  COPD  -duonebs   Hypotension  -lower dose of BB and if SBP > 110 then safe to give  CKD 3  -unsure baseline   DVT prophylaxis  -lovenox    She is a DNR CCA and also place in step down as on 15L NRB

## 2019-05-07 VITALS
HEIGHT: 69 IN | BODY MASS INDEX: 43.4 KG/M2 | TEMPERATURE: 97.8 F | RESPIRATION RATE: 18 BRPM | HEART RATE: 73 BPM | OXYGEN SATURATION: 93 % | DIASTOLIC BLOOD PRESSURE: 68 MMHG | SYSTOLIC BLOOD PRESSURE: 111 MMHG | WEIGHT: 293 LBS

## 2019-05-07 PROBLEM — I26.09 PULMONARY EMBOLISM WITH ACUTE COR PULMONALE (HCC): Status: ACTIVE | Noted: 2019-05-07

## 2019-05-07 PROBLEM — J96.21 ACUTE AND CHRONIC RESPIRATORY FAILURE WITH HYPOXIA (HCC): Status: ACTIVE | Noted: 2019-05-07

## 2019-05-07 LAB
ANION GAP SERPL CALCULATED.3IONS-SCNC: 8 MMOL/L (ref 4–16)
BUN BLDV-MCNC: 18 MG/DL (ref 6–23)
CALCIUM SERPL-MCNC: 8.8 MG/DL (ref 8.3–10.6)
CHLORIDE BLD-SCNC: 94 MMOL/L (ref 99–110)
CHOLESTEROL: 123 MG/DL
CO2: 41 MMOL/L (ref 21–32)
CREAT SERPL-MCNC: 1.1 MG/DL (ref 0.6–1.1)
GFR AFRICAN AMERICAN: 58 ML/MIN/1.73M2
GFR NON-AFRICAN AMERICAN: 48 ML/MIN/1.73M2
GLUCOSE BLD-MCNC: 113 MG/DL (ref 70–99)
HCT VFR BLD CALC: 39.4 % (ref 37–47)
HDLC SERPL-MCNC: 38 MG/DL
HEMOGLOBIN: 11.4 GM/DL (ref 12.5–16)
LDL CHOLESTEROL DIRECT: 63 MG/DL
MAGNESIUM: 2.5 MG/DL (ref 1.8–2.4)
MCH RBC QN AUTO: 27.1 PG (ref 27–31)
MCHC RBC AUTO-ENTMCNC: 28.9 % (ref 32–36)
MCV RBC AUTO: 93.8 FL (ref 78–100)
PDW BLD-RTO: 16.3 % (ref 11.7–14.9)
PLATELET # BLD: 167 K/CU MM (ref 140–440)
PMV BLD AUTO: 11.5 FL (ref 7.5–11.1)
POTASSIUM SERPL-SCNC: 3.5 MMOL/L (ref 3.5–5.1)
RBC # BLD: 4.2 M/CU MM (ref 4.2–5.4)
SODIUM BLD-SCNC: 143 MMOL/L (ref 135–145)
TRIGL SERPL-MCNC: 164 MG/DL
TSH HIGH SENSITIVITY: 1.6 UIU/ML (ref 0.27–4.2)
WBC # BLD: 7.9 K/CU MM (ref 4–10.5)

## 2019-05-07 PROCEDURE — 94761 N-INVAS EAR/PLS OXIMETRY MLT: CPT

## 2019-05-07 PROCEDURE — 94640 AIRWAY INHALATION TREATMENT: CPT

## 2019-05-07 PROCEDURE — 6370000000 HC RX 637 (ALT 250 FOR IP): Performed by: HOSPITALIST

## 2019-05-07 PROCEDURE — 84443 ASSAY THYROID STIM HORMONE: CPT

## 2019-05-07 PROCEDURE — 80061 LIPID PANEL: CPT

## 2019-05-07 PROCEDURE — 93010 ELECTROCARDIOGRAM REPORT: CPT | Performed by: INTERNAL MEDICINE

## 2019-05-07 PROCEDURE — 83735 ASSAY OF MAGNESIUM: CPT

## 2019-05-07 PROCEDURE — 36415 COLL VENOUS BLD VENIPUNCTURE: CPT

## 2019-05-07 PROCEDURE — 99232 SBSQ HOSP IP/OBS MODERATE 35: CPT | Performed by: INTERNAL MEDICINE

## 2019-05-07 PROCEDURE — 6360000002 HC RX W HCPCS: Performed by: HOSPITALIST

## 2019-05-07 PROCEDURE — 2580000003 HC RX 258: Performed by: HOSPITALIST

## 2019-05-07 PROCEDURE — 85027 COMPLETE CBC AUTOMATED: CPT

## 2019-05-07 PROCEDURE — 2700000000 HC OXYGEN THERAPY PER DAY

## 2019-05-07 PROCEDURE — 80048 BASIC METABOLIC PNL TOTAL CA: CPT

## 2019-05-07 PROCEDURE — 83721 ASSAY OF BLOOD LIPOPROTEIN: CPT

## 2019-05-07 PROCEDURE — 6370000000 HC RX 637 (ALT 250 FOR IP): Performed by: NURSE PRACTITIONER

## 2019-05-07 RX ADMIN — CEFTRIAXONE 1 G: 1 INJECTION, POWDER, FOR SOLUTION INTRAMUSCULAR; INTRAVENOUS at 05:52

## 2019-05-07 RX ADMIN — IPRATROPIUM BROMIDE AND ALBUTEROL SULFATE 1 AMPULE: .5; 3 SOLUTION RESPIRATORY (INHALATION) at 12:18

## 2019-05-07 RX ADMIN — SODIUM CHLORIDE, PRESERVATIVE FREE 10 ML: 5 INJECTION INTRAVENOUS at 11:38

## 2019-05-07 RX ADMIN — IPRATROPIUM BROMIDE AND ALBUTEROL SULFATE 1 AMPULE: .5; 3 SOLUTION RESPIRATORY (INHALATION) at 07:22

## 2019-05-07 RX ADMIN — GABAPENTIN 200 MG: 100 CAPSULE ORAL at 11:36

## 2019-05-07 RX ADMIN — ENOXAPARIN SODIUM 135 MG: 150 INJECTION SUBCUTANEOUS at 11:37

## 2019-05-07 RX ADMIN — ACETAMINOPHEN 650 MG: 325 TABLET ORAL at 04:15

## 2019-05-07 RX ADMIN — FUROSEMIDE 20 MG: 10 INJECTION, SOLUTION INTRAVENOUS at 11:36

## 2019-05-07 RX ADMIN — ATORVASTATIN CALCIUM 10 MG: 10 TABLET, FILM COATED ORAL at 11:37

## 2019-05-07 RX ADMIN — AZITHROMYCIN MONOHYDRATE 500 MG: 500 INJECTION, POWDER, LYOPHILIZED, FOR SOLUTION INTRAVENOUS at 06:29

## 2019-05-07 RX ADMIN — ACETAMINOPHEN 650 MG: 325 TABLET ORAL at 11:52

## 2019-05-07 ASSESSMENT — PAIN SCALES - GENERAL
PAINLEVEL_OUTOF10: 2
PAINLEVEL_OUTOF10: 0
PAINLEVEL_OUTOF10: 9
PAINLEVEL_OUTOF10: 0
PAINLEVEL_OUTOF10: 2
PAINLEVEL_OUTOF10: 0

## 2019-05-07 NOTE — PROGRESS NOTES
Pt refused to be turned at this time so unable to observe back side. Family at bedside. Sat 87-88%. Oxygen on at 9 liters high flow nasal cannula. Pt does not want oxygen adjusted at this time. No shortness of breath or resp distress noted.

## 2019-05-07 NOTE — PROGRESS NOTES
pulmonary      SUBJECTIVE: no change     OBJECTIVE    VITALS:  /68   Pulse 72   Temp 97.6 °F (36.4 °C) (Oral)   Resp 18   Ht 5' 9\" (1.753 m)   Wt (!) 307 lb 4 oz (139.4 kg)   SpO2 90%   BMI 45.37 kg/m²   HEAD AND FACE EXAM:  No throat injection, no active exudate,no thrush  NECK EXAM;No JVD, no masses, symmetrical  CHEST EXAM; Expansion equal and symmetrical, no masses  LUNG EXAM; Good breath sounds bilaterally. There are expiratory wheezes both lungs, there are crackles at both lung bases  CARDIOVASCULAR EXAM: Positive S1 and S2, no S3 or S4, no clicks ,no murmurs  RIGHT AND LEFT LOWER EXTRIMITY EXAM: No edema, no swelling, no inflamation  CNS EXAM: Alert and oriented X3          LABS   Lab Results   Component Value Date    WBC 7.9 05/07/2019    HGB 11.4 (L) 05/07/2019    HCT 39.4 05/07/2019    MCV 93.8 05/07/2019     05/07/2019     Lab Results   Component Value Date    CREATININE 1.1 05/07/2019    BUN 18 05/07/2019     05/07/2019    K 3.5 05/07/2019    CL 94 (L) 05/07/2019    CO2 41 (H) 05/07/2019     No results found for: INR, PROTIME     No results found for: PHOS   No results for input(s): PH, PO2ART, HFX4OGU, HCO3, BEART, O2SAT in the last 72 hours. Wt Readings from Last 3 Encounters:   05/06/19 (!) 307 lb 4 oz (139.4 kg)               ASSESMENT  Ac pe        PLAN  1. Status dnrcc  2. I will sign off  3. D/w pt and family.  Questions answered    5/7/2019  Mehreen Lama M.D.

## 2019-05-07 NOTE — PROGRESS NOTES
Today's plan: patient is refusing anticoagulation and will go hospice, will sign off      Admit Date:  5/5/2019    Subjective: tired      Chief complaints on admission  Chief Complaint   Patient presents with    Shortness of Breath         History of present illness:Kya is a 78 y. o.year old who  presents with had concerns including Shortness of Breath. Past medical history:    has a past medical history of CHF (congestive heart failure) ([facility] [address] Cognitive communication deficit, COPD (chronic obstructive pulmonary disease) ([facility] [address] Difficulty in walking, not elsewhere classified, Hypertension, Other intervertebral disc degeneration, lumbar region, and Secondary hypertension, unspecified. Past surgical history:   has no past surgical history on file. Social History:   reports that she has quit smoking. Her smoking use included cigarettes. She has never used smokeless tobacco. She reports that she does not drink alcohol or use drugs. Family history:  family history is not on file. Allergies   Allergen Reactions    Sulfa Antibiotics     Tetanus Toxoids          Objective:   /68   Pulse 72   Temp 97.6 °F (36.4 °C) (Oral)   Resp 18   Ht 5' 9\" (1.753 m)   Wt (!) 307 lb 4 oz (139.4 kg)   SpO2 90%   BMI 45.37 kg/m²       Intake/Output Summary (Last 24 hours) at 5/7/2019 0735  Last data filed at 5/7/2019 0582  Gross per 24 hour   Intake 660 ml   Output --   Net 660 ml       TELEMETRY: Sinus     Physical Exam:  Constitutional:  Well developed, Well nourished, No acute distress, Non-toxic appearance. HENT:  Normocephalic, Atraumatic, Bilateral external ears normal, Oropharynx moist, No oral exudates, Nose normal. Neck- Normal range of motion, No tenderness, Supple, No stridor. Eyes:  PERRL, EOMI, Conjunctiva normal, No discharge. Respiratory:  Normal breath sounds, No respiratory distress, No wheezing, No chest tenderness. ,no use of accessory muscles, diaphragm movement is normal  Cardiovascular: (PMI) apex non displaced,no lifts no thrills, no s3,no s4, Normal heart rate, Normal rhythm, No murmurs, No rubs, No gallops. Carotid arteries pulse and amplitude are normal no bruit, no abdominal bruit noted ( normal abdominal aorta ausculation), femoral arteries pulse and amplitude are normal no bruit, pedal pulses are normal  GI:  Bowel sounds normal, Soft, No tenderness, No masses, No pulsatile masses. : External genitalia appear normal, No masses or lesions. No discharge. No CVA tenderness. Musculoskeletal:  Intact distal pulses+  edema, No tenderness, No cyanosis, No clubbing. Good range of motion in all major joints. No tenderness to palpation or major deformities noted. Back- No tenderness. Integument:  + rash. Lymphatic:  No lymphadenopathy noted. Neurologic:  Alert & oriented x 3, Normal motor function, Normal sensory function, No focal deficits noted.    Psychiatric:  Affect normal, Judgment normal, Mood normal.     Medications:    aspirin  81 mg Oral Daily    atenolol  12.5 mg Oral Nightly    atorvastatin  10 mg Oral Daily    ipratropium-albuterol  1 ampule Inhalation Q4H WA    gabapentin  200 mg Oral TID    multivitamin  1 tablet Oral Daily    sodium chloride flush  10 mL Intravenous 2 times per day    furosemide  20 mg Intravenous BID    enoxaparin  1 mg/kg Subcutaneous BID    cefTRIAXone (ROCEPHIN) IV  1 g Intravenous Q24H    azithromycin  500 mg Intravenous Q24H       sodium chloride flush, magnesium hydroxide, ondansetron, acetaminophen    Lab Data:  CBC:   Recent Labs     05/05/19 2237 05/07/19 0347   WBC 8.0 7.9   HGB 12.2* 11.4*   HCT 41.1 39.4   MCV 94.1 93.8    167     BMP:   Recent Labs     05/05/19 2237 05/06/19  0849 05/07/19  0347    141 143   K 4.0 3.7 3.5   CL 96* 95* 94*   CO2 39* 38* 41*   BUN 22 21 18   CREATININE 1.2* 1.1 1.1     LIVER PROFILE:   Recent Labs     05/05/19 2237   AST 13*   ALT 24   BILITOT 0.7   ALKPHOS 105 PT/INR: No results for input(s): PROTIME, INR in the last 72 hours. APTT: No results for input(s): APTT in the last 72 hours. BNP:  No results for input(s): BNP in the last 72 hours. TROPONIN: @TROPONINI:3@      Assessment:  78 y. o.year old who is admitted for          Plan:  1. PE: as per patient, she has history of PE and was treated with anticoagulation, continue lovenox,however patient is refusing it, case discussed with her daughter in detail, will sign off  2. CHF:echo ordered, continue lasix  3. ? CAD: RECOMMEND TO GET RECORDS FROM [employer]  4. COPD: stable  5. Cellulitis vs stasis dermatitis of legs/ wound care consult recommended      All labs, medications and tests reviewed, continue all other medications of all above medical condition listed as is.       Toni Ruano MD 5/7/2019 7:35 AM

## 2019-05-07 NOTE — DISCHARGE SUMMARY
Eze Ojeda 1940 [id number]  PCP:  No primary care provider on file. Admit date: 5/5/2019  Admitting Physician: Raimundo Logan MD    Discharge date: 5/7/2019 Discharge Physician: Cyril Hairston MD         Hospital Course and Discharge Diagnoses Include:    [facility]    B/L PE, cor pulmonale  - treatment stopped per pt and family request as they want Hospice  - full dose lovenox   - d/w cardio no ekos and treat medically  - echo ef 50%, grade 1 DD  - LE duplex: neg DVT  - cardio following     Acute Resp Failure  - likely 2.2 to above  - on vapotherm  - consult pulmonology     Acute on Chronic CHF  - IV lasix  - strict I and O  - low sodium diet  - cardio following     Possible CAP  - IV Rocephin + Zithromax stopped now     Hx of prior PE last year that resolved per family  Hx of back surgery  COPD               Physical Exam on Discharge date: 05/07/19  Gen:  awake, alert, cooperative, no apparent distress  Head/Eyes:  Normocephalic atraumatic, EOMI   NECK:   symmetrical, trachea midline  LUNGS: Normal Effort   CARDIOVASCULAR:  Normal rate  ABDOMEN: Non tender, non distended, no HSM noted. MUSCULOSKELETAL:  ROM limited  NEUROLOGIC: Alert and Oriented,  Cranial nerves II-XII are grossly intact. SKIN:  no bruising or bleeding, normal skin color,  no redness    Procedures:  See above  Xr Chest Portable    Result Date: 5/5/2019  EXAMINATION: SINGLE XRAY VIEW OF THE CHEST 5/5/2019 10:54 pm COMPARISON: None. HISTORY: ORDERING SYSTEM PROVIDED HISTORY: shortness of breath TECHNOLOGIST PROVIDED HISTORY: Reason for exam:->shortness of breath Ordering Physician Provided Reason for Exam: shortness of breath Acuity: Unknown Type of Exam: Unknown FINDINGS: There is an elevated left hemidiaphragm with atelectatic changes in the left lung base. There is prominence of the aortic knob shadow. No consolidation or effusion is identified.   There

## 2019-05-07 NOTE — CARE COORDINATION
CN consulted to see this patient who uis going home on Hospice. I will not be seeing this patient at this time. Please call [phone] if needs develop.

## 2019-05-07 NOTE — CONSULTS
[address] [place] [address] Consultation Note    Date: 5/7/2019  Name: Madeleine Gonzalez  MRN: [id number]  YOB: 1940   Patient's PCP: No primary care provider on file. Referring Physician: Dr. Stella Bustamante care admit date: 5/5/2019 and 4/11 to 4/19/2019 at [facility]    Informant: Chart reviewed, discussed with hospice nurse liaison, and I met with the patient, her spouse of 48 years and daughter, Marimar Segundo, at the bedside. CC:  Blood clot in lung    [place]: This is a 78year old patient who was admitted on 5/5/2019 from [facility] with hypoxic respiratory failure with oximetry of 98% and Systolic blood pressure  In the 90's. The patient had an 8 day admission at [facility] for leg edema, and decompensated Diastolic heart failure, and was later transferred to [facility] for rehab, although the patient lives with her  in [place], and per her spouse she did not qualify for inpatient rehab at [facility]. The family reports that the patient would make a bit of improvement during the week, and then decline on the weekend. She has been dyspneic, and ultimately sent to the ED. The patient is found to have extensive bilateral PE's with right heart strain/cor pulmonale. She has been admitted by the hospitalist, and seen by Pulmonary and Cardiology, who recommended consideration for catheter directed thrombolysis and life long anticoagulation. The patient has declined any further treatment, anticoagulation or invasive procedures, and clearly tells me that she is \"done\" and just wants to be comfortable. The patient is familiar with hospice from her role as a  in a half-way center, and she requests comfort focused care.  The patient had been on high flow oxygen by Vapotherm, which has now been weaned to a high flow nasal cannula at 9 lpm.       The patient's spouse and daughter relate that the patient has been in declining health over the past 9+ months since a prior PE. She is dependent for ADL's, and is unable to get up the stairs in her home. Her appetite is poor, although there had been weight gain related to fluid retention and lower extremity edema. Hospice philosophy was discussed regarding care and comfort at the end of life. The hospice nurse liaison met with the patient and family last evening. Questions were answered, and emotional support was provided. The patient and family request admission to the inpatient unit in [facility], as that is their home, and most convenient. I do think that the patient meets criteria for inpatient unit for symptom management of dyspnea, and pain, although she does not want to take much more than Acetaminophen. The family is aware that the inpatient unit is for the acute management of symptoms, and if the patient stabilizes, alternative arrangements for home Hospice or extended care facility will be necessary. The patient is DNR-comfort care status. Past Medical History:   Diagnosis Date    [facility]congestive heart failure) ([facility])     Cognitive communication deficit     COPD (chronic obstructive pulmonary disease) ([facility] [address]     Difficulty in walking, not elsewhere classified     Hypertension     Other intervertebral disc degeneration, lumbar region     Pulmonary emboli [facility])     August 2018 and May 2019    Secondary hypertension, unspecified        History reviewed. No pertinent surgical history.     Social History     Socioeconomic History    Marital status:      Spouse name: Not on file    Number of children: 2    Years of education: Not on file    Highest education level: Not on file   Occupational History    Occupation: retired     Comment:    Social Needs    Financial resource strain: Not on file    Food insecurity:     Worry: Not on file     Inability: Not on file   [employer] needs:     Medical: Not on file     Non-medical: Not on file   Tobacco Use    Smoking status: Former Smoker Types: Cigarettes    Smokeless tobacco: Never Used   Substance and Sexual Activity    Alcohol use: Never     Frequency: Never    Drug use: Never    Sexual activity: Not on file   Lifestyle    Physical activity:     Days per week: Not on file     Minutes per session: Not on file    Stress: Not on file   Relationships    Social connections:     Talks on phone: Not on file     Gets together: Not on file     Attends Hinduism service: Not on file     Active member of club or organization: Not on file     Attends meetings of clubs or organizations: Not on file     Relationship status: Not on file    Intimate partner violence:     Fear of current or ex partner: Not on file     Emotionally abused: Not on file     Physically abused: Not on file     Forced sexual activity: Not on file   Other Topics Concern    Not on file   Social History Narrative    Not on file       History reviewed. No pertinent family history. Allergies   Allergen Reactions    Sulfa Antibiotics     Tetanus Toxoids        Medications reviewed  Prior to Admission medications    Medication Sig Start Date End Date Taking?  Authorizing Provider   aspirin 81 MG tablet Take 81 mg by mouth daily   Yes Historical Provider, MD   lidocaine (ASPERCREME LIDOCAINE) 4 % external patch Place 1 patch onto the skin daily To L hip   Yes Historical Provider, MD   atenolol (TENORMIN) 50 MG tablet Take 50 mg by mouth nightly   Yes Historical Provider, MD   fluticasone-vilanterol (BREO ELLIPTA) 100-25 MCG/INH AEPB inhaler Inhale 1 puff into the lungs daily   Yes Historical Provider, MD   Multiple Vitamin (MULTIVITAMINS PO) Take 1 tablet by mouth daily   Yes Historical Provider, MD   furosemide (LASIX) 40 MG tablet Take 40 mg by mouth daily   Yes Historical Provider, MD   acetaminophen (TYLENOL) 500 MG tablet Take 1,000 mg by mouth every 4 hours as needed for Pain   Yes Historical Provider, MD   Magnesium Hydroxide (MILK OF MAGNESIA PO) Take by mouth 30 ml by focal consolidation in the left base. Differential considerations include superimposed infection. US bilateral LE's  No evidence of DVT in either lower extremity. Hepatic Function Panel:    Lab Results   Component Value Date    ALKPHOS 105 05/05/2019    ALT 24 05/05/2019    AST 13 05/05/2019    PROT 5.7 05/05/2019    BILITOT 0.7 05/05/2019    LABALBU 3.5 05/05/2019     Recent Labs     05/05/19  2237   AST 13*   ALT 24   BILITOT 0.7   ALKPHOS 105     CBC:   Recent Labs     05/05/19 2237 05/07/19  0347   WBC 8.0 7.9   HGB 12.2* 11.4*   HCT 41.1 39.4   MCV 94.1 93.8    167     BMP:   Recent Labs     05/05/19 2237 05/06/19  0849 05/07/19  0347    141 143   K 4.0 3.7 3.5   CL 96* 95* 94*   CO2 39* 38* 41*   BUN 22 21 18   CREATININE 1.2* 1.1 1.1   GLUCOSE 123* 135* 113*     Physical Exam:   /68   Pulse 72   Temp 97.6 °F (36.4 °C) (Oral)   Resp 18   Ht 5' 9\" (1.753 m)   Wt (!) 307 lb 4 oz (139.4 kg)   SpO2 90%   BMI 45.37 kg/m²   General: Comfortable, lethargic but arousable and then falls back asleep  HEENT: Mucous membranes are mildly dry, mild conjunctival pallor, sclerae are clear, pupils are meiotic  Neck: thick, difficult to visualize JVP, carotid upstrokes are diminished without bruit   Heart: distant tones, RRR, S1S2, no murmurs  Lungs:  Distant, equal breath sounds bilaterally, diminished at the bases, without rales, scattered rhonchi, end expiratory wheeze   Abdomen: obese, soft, bowel sounds quietly present, no tenderness, nondistended  Extremities:  No mottling, no palpable cords, ++ pedal edema  Neurologic: lethargic, generally weak, without focal cranial nerve or motor weakness    Assessment/Plan:  1. Hypoxic respiratory failure due to extensive bilateral pulmonary emboli with right heart strain. The patient declines procedures, thrombolytics and anticoagulation, and wants only comfort care.  The patient is to be admitted to the [place] inpatient unit (as she lives in [place], and is where her family are located) for the acute management of shortness of breath, respiratory failure. I did discuss with the hospice nurse liaison who will coordinate the transfer, and I provided orders for comfort medications (Lorazepam, Acetaminophen and Morphine - which the family requests only be used for severe distress, if needed). 2. Recurrent pulmonary emboli  3. Acute cor pulmonale and pulmonary hypertension due to the above     Patient Active Problem List   Diagnosis Code    CHF (congestive heart failure), NYHA class I, acute on chronic, combined ([facility]) I50.43    Pulmonary embolism with acute cor pulmonale ([facility]) I26.09    Acute and chronic respiratory failure with hypoxia ([facility]) N11.46       Certification of Terminal Illness: I certify that this patient is eligible for General Inpatient Hospice services for a terminal diagnosis of Hypoxic respiratory failure due to extensive bilateral pulmonary emboli with right heart strain with a life expectancy predicted to be less than 6 months, if the illness follows its expected course.     Jarvis Cuenca MD, [facility]

## 2019-05-08 LAB
EKG ATRIAL RATE: 64 BPM
EKG DIAGNOSIS: NORMAL
EKG P AXIS: 45 DEGREES
EKG P-R INTERVAL: 192 MS
EKG Q-T INTERVAL: 430 MS
EKG QRS DURATION: 148 MS
EKG QTC CALCULATION (BAZETT): 443 MS
EKG R AXIS: -57 DEGREES
EKG T AXIS: 52 DEGREES
EKG VENTRICULAR RATE: 64 BPM